# Patient Record
Sex: MALE | Race: ASIAN | Employment: FULL TIME | URBAN - NONMETROPOLITAN AREA
[De-identification: names, ages, dates, MRNs, and addresses within clinical notes are randomized per-mention and may not be internally consistent; named-entity substitution may affect disease eponyms.]

---

## 2021-02-19 ENCOUNTER — APPOINTMENT (OUTPATIENT)
Dept: GENERAL RADIOLOGY | Age: 26
End: 2021-02-19
Payer: COMMERCIAL

## 2021-02-19 ENCOUNTER — HOSPITAL ENCOUNTER (EMERGENCY)
Age: 26
Discharge: HOME | End: 2021-02-19
Attending: EMERGENCY MEDICINE
Payer: COMMERCIAL

## 2021-02-19 VITALS
SYSTOLIC BLOOD PRESSURE: 130 MMHG | HEIGHT: 72 IN | HEART RATE: 84 BPM | TEMPERATURE: 98.5 F | DIASTOLIC BLOOD PRESSURE: 84 MMHG | OXYGEN SATURATION: 100 % | WEIGHT: 164 LBS | RESPIRATION RATE: 22 BRPM | BODY MASS INDEX: 22.21 KG/M2

## 2021-02-19 DIAGNOSIS — G56.02 CARPAL TUNNEL SYNDROME OF LEFT WRIST: ICD-10-CM

## 2021-02-19 DIAGNOSIS — R10.13 DYSPEPSIA: ICD-10-CM

## 2021-02-19 DIAGNOSIS — R07.89 ATYPICAL CHEST PAIN: Primary | ICD-10-CM

## 2021-02-19 LAB
ALBUMIN SERPL-MCNC: 5 GM/DL (ref 3.4–5)
ALP BLD-CCNC: 97 IU/L (ref 40–129)
ALT SERPL-CCNC: 24 U/L (ref 10–40)
ANION GAP SERPL CALCULATED.3IONS-SCNC: 10 MMOL/L (ref 4–16)
AST SERPL-CCNC: 17 IU/L (ref 15–37)
BASOPHILS ABSOLUTE: 0.1 K/CU MM
BASOPHILS RELATIVE PERCENT: 0.6 % (ref 0–1)
BILIRUB SERPL-MCNC: 0.3 MG/DL (ref 0–1)
BUN BLDV-MCNC: 12 MG/DL (ref 6–23)
CALCIUM SERPL-MCNC: 9.9 MG/DL (ref 8.3–10.6)
CHLORIDE BLD-SCNC: 101 MMOL/L (ref 99–110)
CO2: 29 MMOL/L (ref 21–32)
CREAT SERPL-MCNC: 1.1 MG/DL (ref 0.9–1.3)
DIFFERENTIAL TYPE: ABNORMAL
EOSINOPHILS ABSOLUTE: 0.1 K/CU MM
EOSINOPHILS RELATIVE PERCENT: 1.2 % (ref 0–3)
GFR AFRICAN AMERICAN: >60 ML/MIN/1.73M2
GFR NON-AFRICAN AMERICAN: >60 ML/MIN/1.73M2
GLUCOSE BLD-MCNC: 95 MG/DL (ref 70–99)
HCT VFR BLD CALC: 46.1 % (ref 42–52)
HEMOGLOBIN: 16 GM/DL (ref 13.5–18)
IMMATURE NEUTROPHIL %: 0.1 % (ref 0–0.43)
LYMPHOCYTES ABSOLUTE: 3 K/CU MM
LYMPHOCYTES RELATIVE PERCENT: 34.8 % (ref 24–44)
MCH RBC QN AUTO: 29.4 PG (ref 27–31)
MCHC RBC AUTO-ENTMCNC: 34.7 % (ref 32–36)
MCV RBC AUTO: 84.6 FL (ref 78–100)
MONOCYTES ABSOLUTE: 0.5 K/CU MM
MONOCYTES RELATIVE PERCENT: 6.2 % (ref 0–4)
PDW BLD-RTO: 12.4 % (ref 11.7–14.9)
PLATELET # BLD: 231 K/CU MM (ref 140–440)
PMV BLD AUTO: 10.3 FL (ref 7.5–11.1)
POTASSIUM SERPL-SCNC: 3.7 MMOL/L (ref 3.5–5.1)
RBC # BLD: 5.45 M/CU MM (ref 4.6–6.2)
SEGMENTED NEUTROPHILS ABSOLUTE COUNT: 4.9 K/CU MM
SEGMENTED NEUTROPHILS RELATIVE PERCENT: 57.1 % (ref 36–66)
SODIUM BLD-SCNC: 140 MMOL/L (ref 135–145)
TOTAL IMMATURE NEUTOROPHIL: 0.01 K/CU MM
TOTAL PROTEIN: 7.6 GM/DL (ref 6.4–8.2)
TROPONIN T: <0.01 NG/ML
WBC # BLD: 8.7 K/CU MM (ref 4–10.5)

## 2021-02-19 PROCEDURE — 93005 ELECTROCARDIOGRAM TRACING: CPT | Performed by: EMERGENCY MEDICINE

## 2021-02-19 PROCEDURE — 85025 COMPLETE CBC W/AUTO DIFF WBC: CPT

## 2021-02-19 PROCEDURE — 80053 COMPREHEN METABOLIC PANEL: CPT

## 2021-02-19 PROCEDURE — 99285 EMERGENCY DEPT VISIT HI MDM: CPT

## 2021-02-19 PROCEDURE — 84484 ASSAY OF TROPONIN QUANT: CPT

## 2021-02-19 PROCEDURE — 71046 X-RAY EXAM CHEST 2 VIEWS: CPT

## 2021-02-19 RX ORDER — OMEPRAZOLE 20 MG/1
20 CAPSULE, DELAYED RELEASE ORAL DAILY
Qty: 30 CAPSULE | Refills: 0 | Status: SHIPPED | OUTPATIENT
Start: 2021-02-19

## 2021-02-19 ASSESSMENT — PAIN DESCRIPTION - DESCRIPTORS: DESCRIPTORS: NUMBNESS

## 2021-02-19 ASSESSMENT — PAIN SCALES - GENERAL: PAINLEVEL_OUTOF10: 3

## 2021-02-19 ASSESSMENT — PAIN DESCRIPTION - LOCATION: LOCATION: ARM

## 2021-02-20 NOTE — ED TRIAGE NOTES
Arrived ambulatory to room 3 for triage. Tolerated without difficulty. Bed in lowest position. Call light given. Gowned for exam, placed on cardiac monitor.

## 2021-02-20 NOTE — ED NOTES
Wrist splint applied to the left hand. Discharge instructions reviewed with patient. Reviewed prescriptions with patient. No additional questions asked. Voiced understanding. Encouraged patient to follow up as discussed by the ED physician.      Jeri Zhou RN  02/19/21 2163

## 2021-02-20 NOTE — ED PROVIDER NOTES
EMERGENCY DEPARTMENT ENCOUNTER    Patient: Stacey Helms  MRN: [id number]  : 1995  Date of Evaluation: 2021  ED Provider:  [address] [place]  Chief Complaint   Patient presents with    Numbness     C/o numbness in the left arm intermittently for the past month. Patient states since moving to the [facility] he has had intermittently chest discomfort, not currently having. Patient requesting a \"full work up to check everything out. \"       HPI  Stacey Helms is a 30 y.o. male who presents with complaints of moderate severity, constant numbness described as a tingling sensation in the left thumb, index and middle fingers which has been ongoing for the last several weeks. Denies any actual loss of sensation however. He is unaware of any triggering or modifying factors for this. Denies any injury to the left upper extremity. Denies any neck or upper back pain. Reports he does use a computer a lot for work and types a lot. He also is reporting he has been having some intermittent central chest discomfort over the last several days which is worsened after eating food and he thinks it is secondary to acid reflux. He does not have any chest pain at this present time. Denies any associated shortness of breath. Denies leg pain or swelling. Denies coughing or wheezing. Denies fever. Denies any other associated symptoms or complaints or concerns. Pt denies recent surgery, long car/plane trip, active cancer, OCPs or hormone therapy, history of blood clots or lower extremity edema.       REVIEW OF SYSTEMS    Constitutional: negative for fever, chills  Neurological: negative for HA, focal weakness, loss of sensation  Ophthalmic: negative for vision change  ENT: negative for congestion, rhinorrhea  Cardiovascular: negative for palpitations, edema  Respiratory: negative for SOB, cough  GI: negative for abdominal pain, nausea, vomiting, diarrhea, constipation  : negative for dysuria, hematuria  Musculoskeletal: negative for myalgias, decreased ROM, joint swelling  Dermatological: negative for rash, wounds  Heme: Negative for bleeding, bruising      PAST MEDICAL HISTORY  History reviewed. No pertinent past medical history. CURRENT MEDICATIONS  [unfilled]    ALLERGIES  No Known Allergies    SURGICAL HISTORY  History reviewed. No pertinent surgical history. FAMILY HISTORY  History reviewed. No pertinent family history.     SOCIAL HISTORY  Social History     Socioeconomic History    Marital status: Single     Spouse name: None    Number of children: None    Years of education: None    Highest education level: None   Occupational History    None   Social Needs    Financial resource strain: None    Food insecurity     Worry: None     Inability: None    Transportation needs     Medical: None     Non-medical: None   Tobacco Use    Smoking status: Never Smoker    Smokeless tobacco: Never Used   Substance and Sexual Activity    Alcohol use: Not Currently     Frequency: Never    Drug use: Never    Sexual activity: None   Lifestyle    Physical activity     Days per week: None     Minutes per session: None    Stress: None   Relationships    Social connections     Talks on phone: None     Gets together: None     Attends [employer] service: None     Active member of club or organization: None     Attends meetings of clubs or organizations: None     Relationship status: None    Intimate partner violence     Fear of current or ex partner: None     Emotionally abused: None     Physically abused: None     Forced sexual activity: None   Other Topics Concern    None   Social History Narrative    None         **Past medical, family and social histories, and nursing notes reviewed and verified by me**      PHYSICAL EXAM  VITAL SIGNS:   ED Triage Vitals   Enc Vitals Group      BP       Pulse       Resp       Temp       Temp src       SpO2       Weight       Height 100 FL    MCH 29.4 27 - 31 PG    MCHC 34.7 32.0 - 36.0 %    RDW 12.4 11.7 - 14.9 %    Platelets 535 126 - 239 K/CU MM    MPV 10.3 7.5 - 11.1 FL    Differential Type AUTOMATED DIFFERENTIAL     Segs Relative 57.1 36 - 66 %    Lymphocytes % 34.8 24 - 44 %    Monocytes % 6.2 (H) 0 - 4 %    Eosinophils % 1.2 0 - 3 %    Basophils % 0.6 0 - 1 %    Segs Absolute 4.9 K/CU MM    Lymphocytes Absolute 3.0 K/CU MM    Monocytes Absolute 0.5 K/CU MM    Eosinophils Absolute 0.1 K/CU MM    Basophils Absolute 0.1 K/CU MM    Immature Neutrophil % 0.1 0 - 0.43 %    Total Immature Neutrophil 0.01 K/CU MM   Comprehensive Metabolic Panel w/ Reflex to MG   Result Value Ref Range    Sodium 140 135 - 145 MMOL/L    Potassium 3.7 3.5 - 5.1 MMOL/L    Chloride 101 99 - 110 mMol/L    CO2 29 21 - 32 MMOL/L    BUN 12 6 - 23 MG/DL    CREATININE 1.1 0.9 - 1.3 MG/DL    Glucose 95 70 - 99 MG/DL    Calcium 9.9 8.3 - 10.6 MG/DL    Albumin 5.0 3.4 - 5.0 GM/DL    Total Protein 7.6 6.4 - 8.2 GM/DL    Total Bilirubin 0.3 0.0 - 1.0 MG/DL    ALT 24 10 - 40 U/L    AST 17 15 - 37 IU/L    Alkaline Phosphatase 97 40 - 129 IU/L    GFR Non-African American >60 >60 mL/min/1.73m2    GFR African American >60 >60 mL/min/1.73m2    Anion Gap 10 4 - 16   Troponin   Result Value Ref Range    Troponin T <0.010 <0.01 NG/ML   EKG 12 Lead   Result Value Ref Range    Ventricular Rate 81 BPM    Atrial Rate 81 BPM    P-R Interval 144 ms    QRS Duration 70 ms    Q-T Interval 354 ms    QTc Calculation (Bazett) 411 ms    P Axis 55 degrees    R Axis 45 degrees    T Axis 19 degrees    Diagnosis       Normal sinus rhythm  Possible Left atrial enlargement  Low voltage QRS  Borderline ECG  No previous ECGs available            ABNORMAL LABS:  Labs Reviewed   CBC WITH AUTO DIFFERENTIAL - Abnormal; Notable for the following components:       Result Value    Monocytes % 6.2 (*)     All other components within normal limits   COMPREHENSIVE METABOLIC PANEL W/ REFLEX TO MG FOR LOW K   TROPONIN IMAGING STUDIES ORDERED:  XR CHEST (2 VW)    I have personally viewed the imaging studies. The radiologist interpretation is:   XR CHEST (2 VW)   Final Result   Unremarkable chest.               MEDICATIONS ADMINISTERED:  Medications - No data to display      COURSE & MEDICAL DECISION MAKING  Last vitals: BP (!) 142/97   Pulse 92   Temp 98.5 °F (36.9 °C) (Oral)   Resp 26   Ht 6' (1.829 m)   Wt 164 lb (74.4 kg)   SpO2 100%   BMI 22.24 kg/m²     Patient presented with chest pain which is likely secondary to indigestion or acid reflux. Given history and presentation cardiac workup initiated. Patient had labs, EKG, x-ray and troponin ordered. Patient was placed on cardiac monitor. Differential diagnoses considered included, but were not limited to, acute coronary syndrome, pulmonary embolus, aortic dissection, pericarditis/cardiac tamponade/effusion, myocarditis, pneumonia, pneumothorax, esophageal rupture, pancreatitis and an acute hepatobiliary process. The HEART score for Chest Pain Patients in the ED:    History   Highly suspicious       2  Moderately suspicious  1  Slightly or non-suspicious  0      EKG    Significant ST depression  2  Non-specific repolarization  1  Normal     0    Age       =/> 65 yrs       2  >45 and <65 yrs     1  =45 yrs           0    Risk factors: DM, current or recent (<1 month) smoker, HTN,   hyperlipidemia, family history of CAD, obesity    =/>3 risk factors or history of CAD 2  1 or 2 risk factors       1   No risk factors   0    Troponin   =/>3x normal limit    2  >1x and <3x normal limit  1  = normal limit      0      This patient has a HEART SCORE of 1, as noted above, putting them at low risk for MACE over the next 6 weeks. 0-3: 0.9-1.7% MACE over next 6 weeks  ? consider discharge home with follow up per literature guidelines    4-6: 12-16.6% MACE over next 6 weeks  ? Clinical Observation    7-10: 50-65% MACE over next 6 weeks  ?  Early Invasive Strategies    *MACE= major adverse cardiac event      He is low risk for PE and is PE RC negative. With regards to the numbness in the left hand, this is more of a paresthesia. He does have normal sensation with light touch and pinprick throughout the left hand. The paresthesias are isolated to the left, index finger and middle finger and likely as a result of carpal tunnel syndrome. I have given him a wrist splint and will refer him to hand surgery for follow-up. Additional workup and treatment in the ED as documented above. Patient reassured and will be discharged home. I have explained to the patient in appropriate terminology our work-up in the ED and their diagnosis. I have also given anticipatory guidance and expectant management of their condition as an outpatient as per my custom. The patient was given clear discharge and follow-up instructions including return to the ER immediately for worsening concerns. The patient has been advised to follow-up with their primary care physician and/or referred physician in the next two to three days or sooner if worsening and to return to the ER immediately as above with any concerns. I provided the patient counseling with regard to my customary list of strict return precautions as well as return precautions specific to the cause for today's emergency department visit. The patient will return under these provided conditions, but should also return for new concerns or further worsening. Pt and/or family understand and agree with plan. Clinical Impression:  1. Atypical chest pain    2. Dyspepsia    3. Carpal tunnel syndrome of left wrist        Disposition referral (if applicable):   Jennyfer Neil DO  [facility] [address] [place] [address]  [address] [address]  [phone]    Call   For follow-up with hand specialist    [facility] Emergency Department  [place] [address]  [address] [facility] [address]  [phone]    If symptoms worsen      Disposition medications (if applicable):  New Prescriptions    No medications on file       ED Provider Disposition Time  DISPOSITION Decision To Discharge 02/19/2021 10:43:31 PM          Electronically signed by: Kari De Luna M.D., 2/19/2021 10:47 PM      This dictation was created with voice recognition software. While attempts have been made to review the dictation as it is transcribed, on occasion the spoken word can be misinterpreted by the technology leading to omissions or inappropriate words, phrases or sentences.         Jack Skaggs MD  02/19/21 4599

## 2021-02-22 PROCEDURE — 93010 ELECTROCARDIOGRAM REPORT: CPT | Performed by: INTERNAL MEDICINE

## 2021-02-23 LAB
EKG ATRIAL RATE: 81 BPM
EKG DIAGNOSIS: NORMAL
EKG P AXIS: 55 DEGREES
EKG P-R INTERVAL: 144 MS
EKG Q-T INTERVAL: 354 MS
EKG QRS DURATION: 70 MS
EKG QTC CALCULATION (BAZETT): 411 MS
EKG R AXIS: 45 DEGREES
EKG T AXIS: 19 DEGREES
EKG VENTRICULAR RATE: 81 BPM